# Patient Record
Sex: MALE | Race: WHITE | NOT HISPANIC OR LATINO | Employment: FULL TIME | URBAN - METROPOLITAN AREA
[De-identification: names, ages, dates, MRNs, and addresses within clinical notes are randomized per-mention and may not be internally consistent; named-entity substitution may affect disease eponyms.]

---

## 2017-11-20 ENCOUNTER — APPOINTMENT (OUTPATIENT)
Dept: CT IMAGING | Facility: CLINIC | Age: 47
End: 2017-11-20
Attending: EMERGENCY MEDICINE
Payer: COMMERCIAL

## 2017-11-20 ENCOUNTER — HOSPITAL ENCOUNTER (EMERGENCY)
Facility: CLINIC | Age: 47
Discharge: HOME | End: 2017-11-21
Attending: EMERGENCY MEDICINE | Admitting: EMERGENCY MEDICINE
Payer: COMMERCIAL

## 2017-11-20 DIAGNOSIS — R55 VASOVAGAL SYNCOPE: ICD-10-CM

## 2017-11-20 DIAGNOSIS — H53.9 VISUAL DISTURBANCE: ICD-10-CM

## 2017-11-20 LAB
ANION GAP SERPL CALCULATED.3IONS-SCNC: 5 MMOL/L (ref 3–14)
BASOPHILS # BLD AUTO: 0 10E9/L (ref 0–0.2)
BASOPHILS NFR BLD AUTO: 0.4 %
BUN SERPL-MCNC: 20 MG/DL (ref 7–30)
CALCIUM SERPL-MCNC: 9.4 MG/DL (ref 8.5–10.1)
CHLORIDE SERPL-SCNC: 109 MMOL/L (ref 94–109)
CO2 SERPL-SCNC: 30 MMOL/L (ref 20–32)
CREAT SERPL-MCNC: 1 MG/DL (ref 0.66–1.25)
DIFFERENTIAL METHOD BLD: NORMAL
EOSINOPHIL # BLD AUTO: 0.2 10E9/L (ref 0–0.7)
EOSINOPHIL NFR BLD AUTO: 3.1 %
ERYTHROCYTE [DISTWIDTH] IN BLOOD BY AUTOMATED COUNT: 13 % (ref 10–15)
GFR SERPL CREATININE-BSD FRML MDRD: 81 ML/MIN/1.7M2
GLUCOSE SERPL-MCNC: 85 MG/DL (ref 70–99)
HCT VFR BLD AUTO: 41.5 % (ref 40–53)
HGB BLD-MCNC: 14.1 G/DL (ref 13.3–17.7)
IMM GRANULOCYTES # BLD: 0 10E9/L (ref 0–0.4)
IMM GRANULOCYTES NFR BLD: 0.3 %
INTERPRETATION ECG - MUSE: NORMAL
LYMPHOCYTES # BLD AUTO: 2.2 10E9/L (ref 0.8–5.3)
LYMPHOCYTES NFR BLD AUTO: 31.3 %
MAGNESIUM SERPL-MCNC: 2.4 MG/DL (ref 1.6–2.3)
MCH RBC QN AUTO: 29.4 PG (ref 26.5–33)
MCHC RBC AUTO-ENTMCNC: 34 G/DL (ref 31.5–36.5)
MCV RBC AUTO: 87 FL (ref 78–100)
MONOCYTES # BLD AUTO: 0.6 10E9/L (ref 0–1.3)
MONOCYTES NFR BLD AUTO: 8.1 %
NEUTROPHILS # BLD AUTO: 4.1 10E9/L (ref 1.6–8.3)
NEUTROPHILS NFR BLD AUTO: 56.8 %
NRBC # BLD AUTO: 0 10*3/UL
NRBC BLD AUTO-RTO: 0 /100
PLATELET # BLD AUTO: 218 10E9/L (ref 150–450)
POTASSIUM SERPL-SCNC: 3.5 MMOL/L (ref 3.4–5.3)
RBC # BLD AUTO: 4.8 10E12/L (ref 4.4–5.9)
SODIUM SERPL-SCNC: 144 MMOL/L (ref 133–144)
WBC # BLD AUTO: 7.2 10E9/L (ref 4–11)

## 2017-11-20 PROCEDURE — 99285 EMERGENCY DEPT VISIT HI MDM: CPT | Mod: 25

## 2017-11-20 PROCEDURE — 93005 ELECTROCARDIOGRAM TRACING: CPT

## 2017-11-20 PROCEDURE — 80048 BASIC METABOLIC PNL TOTAL CA: CPT | Performed by: EMERGENCY MEDICINE

## 2017-11-20 PROCEDURE — 85025 COMPLETE CBC W/AUTO DIFF WBC: CPT | Performed by: EMERGENCY MEDICINE

## 2017-11-20 PROCEDURE — 70450 CT HEAD/BRAIN W/O DYE: CPT

## 2017-11-20 PROCEDURE — 83735 ASSAY OF MAGNESIUM: CPT | Performed by: EMERGENCY MEDICINE

## 2017-11-20 ASSESSMENT — ENCOUNTER SYMPTOMS
WEAKNESS: 0
HEADACHES: 0
SHORTNESS OF BREATH: 0
CHILLS: 0
FEVER: 0
NECK STIFFNESS: 0
DIZZINESS: 0
NAUSEA: 0
SPEECH DIFFICULTY: 0
NECK PAIN: 0
VOMITING: 0
EYE PAIN: 1

## 2017-11-20 ASSESSMENT — VISUAL ACUITY
OS: 20/15
OD: 20/15

## 2017-11-20 NOTE — ED AVS SNAPSHOT
Emergency Department    [address] [facility] [address]    Phone:  [phone]    Fax:  [phone]                                       Arpit Harper   MRN: [id number]    Department:   Emergency Department   Date of Visit:  11/20/2017           Patient Information     Date Of Birth          1970        Your diagnoses for this visit were:     Visual disturbance     Vasovagal syncope        You were seen by Yasmany Ballard MD.      Follow-up Information     Follow up with Your primary doctor In 1 week.        Follow up with  Emergency Department.    Specialty:  EMERGENCY MEDICINE    Why:  As needed    Contact information:    [address] [facility] [address] [phone]        Discharge Instructions       1. Please follow-up with your primary doctor.  2. Please keep track of your blood pressure at home.  3. Please discuss imaging such as an MRI with your primary doctor if you continue to have similar symptoms.  4. Please return to the ED as needed for new or worsening such as repeat episodes of fainting, focal weakness, further vision changes, speech changes, any other concerning symptoms.          24 Hour Appointment Hotline       To make an appointment at any [facility], call [phone]). If you don't have a family doctor or clinic, we will help you find one. [facility] are conveniently located to serve the needs of you and your family.             Review of your medicines      Notice     You have not been prescribed any medications.            Procedures and tests performed during your visit     Basic metabolic panel    CBC with platelets differential    CT Head w/o Contrast    EKG 12 lead    Magnesium      Orders Needing Specimen Collection     None      Pending Results     No orders found for last 3 day(s).            Pending Culture Results     No orders found for last 3 day(s).            Pending Results Instructions      If you had any lab results that were not finalized at the time of your Discharge, you can call the ED Lab Result RN at [phone]. You will be contacted by this team for any positive Lab results or changes in treatment. The nurses are available 7 days a week from 10A to 6:30P.  You can leave a message 24 hours per day and they will return your call.        Test Results From Your Hospital Stay        11/20/2017 11:24 PM      Component Results     Component Value Ref Range & Units Status    WBC 7.2 4.0 - 11.0 10e9/L Final    RBC Count 4.80 4.4 - 5.9 10e12/L Final    Hemoglobin 14.1 13.3 - 17.7 g/dL Final    Hematocrit 41.5 40.0 - 53.0 % Final    MCV 87 78 - 100 fl Final    MCH 29.4 26.5 - 33.0 pg Final    MCHC 34.0 31.5 - 36.5 g/dL Final    RDW 13.0 10.0 - 15.0 % Final    Platelet Count 218 150 - 450 10e9/L Final    Diff Method Automated Method  Final    % Neutrophils 56.8 % Final    % Lymphocytes 31.3 % Final    % Monocytes 8.1 % Final    % Eosinophils 3.1 % Final    % Basophils 0.4 % Final    % Immature Granulocytes 0.3 % Final    Nucleated RBCs 0 0 /100 Final    Absolute Neutrophil 4.1 1.6 - 8.3 10e9/L Final    Absolute Lymphocytes 2.2 0.8 - 5.3 10e9/L Final    Absolute Monocytes 0.6 0.0 - 1.3 10e9/L Final    Absolute Eosinophils 0.2 0.0 - 0.7 10e9/L Final    Absolute Basophils 0.0 0.0 - 0.2 10e9/L Final    Abs Immature Granulocytes 0.0 0 - 0.4 10e9/L Final    Absolute Nucleated RBC 0.0  Final         11/20/2017 11:38 PM      Component Results     Component Value Ref Range & Units Status    Sodium 144 133 - 144 mmol/L Final    Potassium 3.5 3.4 - 5.3 mmol/L Final    Chloride 109 94 - 109 mmol/L Final    Carbon Dioxide 30 20 - 32 mmol/L Final    Anion Gap 5 3 - 14 mmol/L Final    Glucose 85 70 - 99 mg/dL Final    Urea Nitrogen 20 7 - 30 mg/dL Final    Creatinine 1.00 0.66 - 1.25 mg/dL Final    GFR Estimate 81 >60 mL/min/1.7m2 Final    Non  GFR Calc    GFR Estimate If Black >90 >60 mL/min/1.7m2  Final     GFR Calc    Calcium 9.4 8.5 - 10.1 mg/dL Final         11/20/2017 11:52 PM      Narrative     CT HEAD W/O CONTRAST  11/20/2017 11:38 PM     HISTORY: Fall, head trauma, vision changes, evaluate intracranial  hemorrhage or mass.     TECHNIQUE: Axial images of the head and coronal reformations without  IV contrast material. Radiation dose for this scan was reduced using  automated exposure control, adjustment of the mA and/or kV according  to patient size, or iterative reconstruction technique.    COMPARISON: None.    FINDINGS: No intracranial hemorrhage, mass or mass effect. No acute  infarct identified. No shift of midline structures. The ventricles are  symmetric. No skull fractures.        Impression     IMPRESSION: No acute intracranial findings.    DOMINGA MANCIA MD         11/20/2017 11:38 PM      Component Results     Component Value Ref Range & Units Status    Magnesium 2.4 (H) 1.6 - 2.3 mg/dL Final                Clinical Quality Measure: Blood Pressure Screening     Your blood pressure was checked while you were in the emergency department today. The last reading we obtained was  BP: (!) 146/104 . Please read the guidelines below about what these numbers mean and what you should do about them.  If your systolic blood pressure (the top number) is less than 120 and your diastolic blood pressure (the bottom number) is less than 80, then your blood pressure is normal. There is nothing more that you need to do about it.  If your systolic blood pressure (the top number) is 120-139 or your diastolic blood pressure (the bottom number) is 80-89, your blood pressure may be higher than it should be. You should have your blood pressure rechecked within a year by a primary care provider.  If your systolic blood pressure (the top number) is 140 or greater or your diastolic blood pressure (the bottom number) is 90 or greater, you may have high blood pressure. High blood pressure is treatable,  "but if left untreated over time it can put you at risk for heart attack, stroke, or kidney failure. You should have your blood pressure rechecked by a primary care provider within the next 4 weeks.  If your provider in the emergency department today gave you specific instructions to follow-up with your doctor or provider even sooner than that, you should follow that instruction and not wait for up to 4 weeks for your follow-up visit.        Thank you for choosing [facility]       Thank you for choosing [facility] for your care. Our goal is always to provide you with excellent care. Hearing back from our patients is one way we can continue to improve our services. Please take a few minutes to complete the written survey that you may receive in the mail after you visit with us. Thank you!        [facility] Information     [facility] lets you send messages to your doctor, view your test results, renew your prescriptions, schedule appointments and more. To sign up, go to [email or web address] . Click on \"Log in\" on the left side of the screen, which will take you to the Welcome page. Then click on \"Sign up Now\" on the right side of the page.     You will be asked to enter the access code listed below, as well as some personal information. Please follow the directions to create your username and password.     Your access code is: [email or web address]  Expires: 2018 12:18 AM     Your access code will  in 90 days. If you need help or a new code, please call your [facility] clinic or [phone].        Care EveryWhere ID     This is your Care EveryWhere ID. This could be used by other organizations to access your [facility] medical records  [id number]        Equal Access to Services     [facility] : Hadkan Ramirez, catrachito neumann, yary riddle. So [facility] [phone].    ATENCIÓN: Si habla español, tiene a wilson disposición servicios gratuitos de asistencia " katerina Suttonnorma al [phone].    We comply with applicable federal civil rights laws and [place] laws. We do not discriminate on the basis of race, color, national origin, age, disability, sex, sexual orientation, or gender identity.            After Visit Summary       This is your record. Keep this with you and show to your community pharmacist(s) and doctor(s) at your next visit.

## 2017-11-20 NOTE — ED AVS SNAPSHOT
Emergency Department    [address] [place] [address]    Phone:  [phone]    Fax:  [phone]                                       Arpti Harper   MRN: [id number]    Department:   Emergency Department   Date of Visit:  11/20/2017           After Visit Summary Signature Page     I have received my discharge instructions, and my questions have been answered. I have discussed any challenges I see with this plan with the nurse or doctor.    ..........................................................................................................................................  Patient/Patient Representative Signature      ..........................................................................................................................................  Patient Representative Print Name and Relationship to Patient    ..................................................               ................................................  Date                                            Time    ..........................................................................................................................................  Reviewed by Signature/Title    ...................................................              ..............................................  Date                                                            Time

## 2017-11-21 VITALS
DIASTOLIC BLOOD PRESSURE: 104 MMHG | TEMPERATURE: 97.9 F | OXYGEN SATURATION: 95 % | RESPIRATION RATE: 16 BRPM | WEIGHT: 175 LBS | SYSTOLIC BLOOD PRESSURE: 146 MMHG | BODY MASS INDEX: 25.05 KG/M2 | HEIGHT: 70 IN

## 2017-11-21 ASSESSMENT — ENCOUNTER SYMPTOMS
LIGHT-HEADEDNESS: 1
MYALGIAS: 1

## 2017-11-21 NOTE — ED NOTES
Pt states that he has a Hx of moderate concussion early 2015. Pt c/o some mild tingling on mid to right side of head.   Denies any nausea, vomiting, headache or photophobia. Pt also concerned about his blood pressure.  Jessica Barajas RN,.......................................... 11/20/2017   10:01 PM

## 2017-11-21 NOTE — ED NOTES
MD at bedside.  Jessica Barajas RN,.......................................... 11/20/2017   10:30 PM

## 2017-11-21 NOTE — DISCHARGE INSTRUCTIONS
1. Please follow-up with your primary doctor.  2. Please keep track of your blood pressure at home.  3. Please discuss imaging such as an MRI with your primary doctor if you continue to have similar symptoms.  4. Please return to the ED as needed for new or worsening such as repeat episodes of fainting, focal weakness, further vision changes, speech changes, any other concerning symptoms.

## 2018-07-06 DIAGNOSIS — E78.5 HYPERLIPEMIA: Primary | ICD-10-CM

## 2018-07-18 ENCOUNTER — MEDICAL CORRESPONDENCE (OUTPATIENT)
Dept: HEALTH INFORMATION MANAGEMENT | Facility: CLINIC | Age: 48
End: 2018-07-18

## 2018-08-07 ENCOUNTER — HOSPITAL ENCOUNTER (OUTPATIENT)
Dept: NUCLEAR MEDICINE | Facility: CLINIC | Age: 48
Setting detail: NUCLEAR MEDICINE
Discharge: HOME | End: 2018-08-07
Attending: FAMILY MEDICINE | Admitting: FAMILY MEDICINE
Payer: COMMERCIAL

## 2018-08-07 VITALS — BODY MASS INDEX: 23.68 KG/M2 | WEIGHT: 165 LBS

## 2018-08-07 DIAGNOSIS — R10.11 RUQ ABDOMINAL PAIN: ICD-10-CM

## 2018-08-07 PROCEDURE — 25000128 H RX IP 250 OP 636: Performed by: FAMILY MEDICINE

## 2018-08-07 PROCEDURE — A9537 TC99M MEBROFENIN: HCPCS | Performed by: FAMILY MEDICINE

## 2018-08-07 PROCEDURE — 78227 HEPATOBIL SYST IMAGE W/DRUG: CPT

## 2018-08-07 PROCEDURE — 34300033 ZZH RX 343: Performed by: FAMILY MEDICINE

## 2018-08-07 RX ORDER — SINCALIDE 5 UG/5ML
0.02 INJECTION, POWDER, LYOPHILIZED, FOR SOLUTION INTRAVENOUS ONCE
Status: COMPLETED | OUTPATIENT
Start: 2018-08-07 | End: 2018-08-07

## 2018-08-07 RX ORDER — KIT FOR THE PREPARATION OF TECHNETIUM TC 99M MEBROFENIN 45 MG/10ML
5 INJECTION, POWDER, LYOPHILIZED, FOR SOLUTION INTRAVENOUS ONCE
Status: COMPLETED | OUTPATIENT
Start: 2018-08-07 | End: 2018-08-07

## 2018-08-07 RX ADMIN — MEBROFENIN 6.3 MILLICURIE: 45 INJECTION, POWDER, LYOPHILIZED, FOR SOLUTION INTRAVENOUS at 07:25

## 2018-08-07 RX ADMIN — SINCALIDE 1.5 MCG: 5 INJECTION, POWDER, LYOPHILIZED, FOR SOLUTION INTRAVENOUS at 08:45

## 2022-02-25 NOTE — ED PROVIDER NOTES
"  History     Chief Complaint:  Head Injury     HPI   Arpit Harper is a 47 year old male who presents to the emergency department today for evaluation of head injury secondary to syncopal episode and visual disturbance. The patient reports at 1130 yesterday he went to the bathroom and had a strenuous bowel movement with lightheadedness and he felt like he was going to pass out so he sat on the ground and when he went to stand up he passed out with a head injury and knee pain. The patient reports he felt knee soreness yesterday, but otherwise was fine until today when he noticed a small left-sided head pain from where he hit his head and visual disturbance around 2030, when he starting to see \"wavy\" vision, like isolated lines in one area, on the right side. He consulted with his wife, who is a nurse, who examined his eyes and body and recommended he come in to the ED after his blood pressure came back in the 160s. The patient reports visual disturbance lasted for 2 hours, only when he was sitting down, and has since resolved. The patient reports he feels \"pins and needles\" across his head presently, but very mild, with mild left eye ache that started within an hour after the visual disturbance. The patient endorses history of concussion. The patient reports history of syncope after urinating and on his way out of the restroom, which occurred 15 years ago, which he was evaluated for at [facility] ED. The patient reports he has had headaches before, but no eye pain. The patient reports history of high blood pressure managed by weight loss and diet control. The patient reports his mother has MS. The patient denies headache, current bowel movement, urinary symptoms, dizziness, speech changes, neck pain, neck stiffness, chest pain, shortness of breath, nausea, vomiting, chills, fevers, or weakness.     Allergies:  No Known Drug Allergies    Medications:    Medications reviewed. No current medications.     Past Medical " "History:    Medical history reviewed. No pertinent medical history.    Past Surgical History:    Orthopedic Surgery     Family History:    History reviewed. No pertinent family history.     Social History:  The patient was accompanied to the ED by wife.  Smoking Status: Never Smoker  Smokeless Tobacco: Never Used  Alcohol Use: Negative   Marital Status:   [2]     Review of Systems   Constitutional: Negative for chills and fever.   Eyes: Positive for pain (mild, over left eye) and visual disturbance (wavy area on right eye; no double vision).   Respiratory: Negative for shortness of breath.    Cardiovascular: Negative for chest pain.   Gastrointestinal: Negative for nausea and vomiting.   Genitourinary: Negative.    Musculoskeletal: Positive for myalgias (traumatic knee pain). Negative for neck pain and neck stiffness.   Neurological: Positive for syncope and light-headedness. Negative for dizziness, speech difficulty, weakness and headaches.   All other systems reviewed and are negative.    Physical Exam     Patient Vitals for the past 24 hrs:   BP Temp Temp src Heart Rate Resp SpO2 Height Weight   11/21/17 0000 (!) 146/104 - - - - 95 % - -   11/20/17 2352 (!) 148/98 - - - - - - -   11/20/17 2345 (!) 148/98 - - - - 93 % - -   11/20/17 2309 (!) 151/97 - - - - 95 % - -   11/20/17 2300 - - - - - 93 % - -   11/20/17 2230 (!) 169/111 - - - - 96 % - -   11/20/17 2215 (!) 154/100 - - - - 96 % - -   11/20/17 2200 (!) 152/103 - - - - 92 % - -   11/20/17 2121 (!) 160/108 97.9  F (36.6  C) Oral 79 16 94 % 1.778 m (5' 10\") 79.4 kg (175 lb)     Physical Exam  Constitutional: Well developed, nontox appearance  Head: Atraumatic.   Mouth/Throat: Oropharynx is clear and moist.   Neck:  no stridor  Eyes: no scleral icterus, PERRL, EOMI, visual fields intact bilat  Cardiovascular: RRR, 2+ bilat radial pulses  Pulmonary/Chest: nml resp effort, Clear BS bilat  Abdominal: ND, +BS, soft, NT, no rebound or guarding   : no CVA " tenderness bilat  Ext: Warm, well perfused, no edema  Neurological: A&O, CNII-XII intact, nml finger to nose, 5/5 strength throughout upper and lower ext, symmetric; sensation grossly intact, steady gait  Skin: Skin is warm and dry.   Psychiatric: Behavior is normal. Thought content normal.   Nursing note and vitals reviewed.    Emergency Department Course     ECG:  ECG taken at 2124, ECG read at 2130  Normal sinus rhythm  Normal ECG  Rate 75 bpm. ID interval 154 ms. QRS duration 86 ms. QT/QTc 370/413 ms. P-R-T axes 63 55 45.    Imaging:  Radiology findings were communicated with the patient who voiced understanding of the findings.    CT Head w/o Contrast  1. No acute intracranial findings.  DOMINGA MANCIA MD  Reading per radiology    Laboratory:  Laboratory findings were communicated with the patient who voiced understanding of the findings.    CBC: WBC 7.2, HGB 14.1,   BMP: WNL (Creatinine 1.00)  Magnesium: 2.4 (H)    Emergency Department Course:    2121 Nursing notes and vitals reviewed.    2236 I performed an exam of the patient as documented above.     2337 The patient was sent for a CT Head w/o Contrast while in the emergency department, results above.     2357 I discussed the treatment plan with the patient. They expressed understanding of this plan and consented to discharge. They will be discharged home with instructions for care and follow up. In addition, the patient will return to the emergency department if their symptoms persist, worsen, if new symptoms arise or if there is any concern.  All questions were answered.    2357 I personally reviewed the ECG, imaging, and laboratory results with the patient and answered all related questions prior to discharge.    Impression & Plan      Medical Decision Making:  Arpit Harper is a 47 year old male who presents to the emergency department today for evaluation of transient visual disturbance, left frontal headache, recent syncopal  episode.    Differential diagnosis includes vasovagal syncope, concussion, electrolyte abnormality, intracranial abnormality such as bleed or mass, migraine or migraine variant w/ aura/scintillating scotoma. The patient has a normal neurologic exam and intact visual fields. His ECG is noted as above with no signs of arrhythmia. The patient likely had a vasovagal syncopal episode after his bowel movement and he reports having vasovagal syncopal episodes with previous episodes of urination. Labs were ordered as noted and unremarkable other than minimally elevated magnesium. At this point, I feel the patient is safe for discharge. He is hypertensive in the emergency department which improved without intervention. Recommendations given regarding keeping track of his blood pressures at home and follow up with primary care doctor for recheck. CT returned normal and was ordered given that he has not had these symptoms prior. I recommended the patient follow up with his primary care physician and discussed further imaging modalities such as MRI if he continues to have similar visual disturbances. It seems more likely this is migraine, but given this is a new type of headache, onset at the patient's age, it would not be unreasonable to acquire further imaging should symptoms continue. He is counseled on results, diagnosis, and disposition. He is understanding and agreeable to plan. The patient is stable condition.     Diagnosis:    ICD-10-CM    1. Visual disturbance H53.9    2. Vasovagal syncope R55      Disposition:   The patient is discharged to home.    Scribe Disclosure:  Pamela ALONSO, am serving as a scribe at 10:29 PM on 11/20/2017 to document services personally performed by Yasmany aBllard MD based on my observations and the provider's statements to me.     EMERGENCY DEPARTMENT       Yasmany Ballard MD  11/21/17 0890     25-Feb-2022 11:50

## 2024-08-25 ENCOUNTER — HOSPITAL ENCOUNTER (EMERGENCY)
Facility: CLINIC | Age: 54
Discharge: HOME | End: 2024-08-25
Attending: EMERGENCY MEDICINE | Admitting: EMERGENCY MEDICINE
Payer: COMMERCIAL

## 2024-08-25 ENCOUNTER — APPOINTMENT (OUTPATIENT)
Dept: CT IMAGING | Facility: CLINIC | Age: 54
End: 2024-08-25
Attending: EMERGENCY MEDICINE
Payer: COMMERCIAL

## 2024-08-25 VITALS
WEIGHT: 195 LBS | RESPIRATION RATE: 18 BRPM | TEMPERATURE: 97.9 F | HEART RATE: 78 BPM | DIASTOLIC BLOOD PRESSURE: 90 MMHG | BODY MASS INDEX: 27.92 KG/M2 | OXYGEN SATURATION: 94 % | HEIGHT: 70 IN | SYSTOLIC BLOOD PRESSURE: 147 MMHG

## 2024-08-25 DIAGNOSIS — R91.1 PULMONARY NODULE: ICD-10-CM

## 2024-08-25 DIAGNOSIS — R07.9 CHEST PAIN, UNSPECIFIED TYPE: ICD-10-CM

## 2024-08-25 LAB
ANION GAP SERPL CALCULATED.3IONS-SCNC: 11 MMOL/L (ref 7–15)
BASOPHILS # BLD AUTO: 0.1 10E3/UL (ref 0–0.2)
BASOPHILS NFR BLD AUTO: 1 %
BUN SERPL-MCNC: 17.7 MG/DL (ref 6–20)
CALCIUM SERPL-MCNC: 10 MG/DL (ref 8.8–10.4)
CHLORIDE SERPL-SCNC: 99 MMOL/L (ref 98–107)
CREAT SERPL-MCNC: 1.22 MG/DL (ref 0.67–1.17)
EGFRCR SERPLBLD CKD-EPI 2021: 71 ML/MIN/1.73M2
EOSINOPHIL # BLD AUTO: 0.1 10E3/UL (ref 0–0.7)
EOSINOPHIL NFR BLD AUTO: 2 %
ERYTHROCYTE [DISTWIDTH] IN BLOOD BY AUTOMATED COUNT: 12.3 % (ref 10–15)
GLUCOSE SERPL-MCNC: 100 MG/DL (ref 70–99)
HCO3 SERPL-SCNC: 30 MMOL/L (ref 22–29)
HCT VFR BLD AUTO: 45.1 % (ref 40–53)
HGB BLD-MCNC: 15.4 G/DL (ref 13.3–17.7)
HOLD SPECIMEN: NORMAL
IMM GRANULOCYTES # BLD: 0 10E3/UL
IMM GRANULOCYTES NFR BLD: 1 %
LYMPHOCYTES # BLD AUTO: 2.4 10E3/UL (ref 0.8–5.3)
LYMPHOCYTES NFR BLD AUTO: 32 %
MCH RBC QN AUTO: 29.8 PG (ref 26.5–33)
MCHC RBC AUTO-ENTMCNC: 34.1 G/DL (ref 31.5–36.5)
MCV RBC AUTO: 87 FL (ref 78–100)
MONOCYTES # BLD AUTO: 0.5 10E3/UL (ref 0–1.3)
MONOCYTES NFR BLD AUTO: 7 %
NEUTROPHILS # BLD AUTO: 4.5 10E3/UL (ref 1.6–8.3)
NEUTROPHILS NFR BLD AUTO: 58 %
NRBC # BLD AUTO: 0 10E3/UL
NRBC BLD AUTO-RTO: 0 /100
PLATELET # BLD AUTO: 284 10E3/UL (ref 150–450)
POTASSIUM SERPL-SCNC: 3.6 MMOL/L (ref 3.4–5.3)
RBC # BLD AUTO: 5.17 10E6/UL (ref 4.4–5.9)
SODIUM SERPL-SCNC: 140 MMOL/L (ref 135–145)
TROPONIN T SERPL HS-MCNC: <6 NG/L
WBC # BLD AUTO: 7.7 10E3/UL (ref 4–11)

## 2024-08-25 PROCEDURE — 250N000009 HC RX 250: Performed by: EMERGENCY MEDICINE

## 2024-08-25 PROCEDURE — 70450 CT HEAD/BRAIN W/O DYE: CPT

## 2024-08-25 PROCEDURE — 85025 COMPLETE CBC W/AUTO DIFF WBC: CPT | Performed by: EMERGENCY MEDICINE

## 2024-08-25 PROCEDURE — 84484 ASSAY OF TROPONIN QUANT: CPT | Performed by: EMERGENCY MEDICINE

## 2024-08-25 PROCEDURE — 71260 CT THORAX DX C+: CPT

## 2024-08-25 PROCEDURE — 70496 CT ANGIOGRAPHY HEAD: CPT

## 2024-08-25 PROCEDURE — 93005 ELECTROCARDIOGRAM TRACING: CPT

## 2024-08-25 PROCEDURE — 36415 COLL VENOUS BLD VENIPUNCTURE: CPT | Performed by: EMERGENCY MEDICINE

## 2024-08-25 PROCEDURE — 99285 EMERGENCY DEPT VISIT HI MDM: CPT | Mod: 25

## 2024-08-25 PROCEDURE — 80048 BASIC METABOLIC PNL TOTAL CA: CPT | Performed by: EMERGENCY MEDICINE

## 2024-08-25 PROCEDURE — 250N000011 HC RX IP 250 OP 636: Performed by: EMERGENCY MEDICINE

## 2024-08-25 RX ORDER — IOPAMIDOL 755 MG/ML
90 INJECTION, SOLUTION INTRAVASCULAR ONCE
Status: COMPLETED | OUTPATIENT
Start: 2024-08-25 | End: 2024-08-25

## 2024-08-25 RX ADMIN — SODIUM CHLORIDE 100 ML: 9 INJECTION, SOLUTION INTRAVENOUS at 20:36

## 2024-08-25 RX ADMIN — IOPAMIDOL 90 ML: 755 INJECTION, SOLUTION INTRAVENOUS at 20:35

## 2024-08-25 ASSESSMENT — ACTIVITIES OF DAILY LIVING (ADL)
ADLS_ACUITY_SCORE: 35

## 2024-08-25 ASSESSMENT — COLUMBIA-SUICIDE SEVERITY RATING SCALE - C-SSRS
1. IN THE PAST MONTH, HAVE YOU WISHED YOU WERE DEAD OR WISHED YOU COULD GO TO SLEEP AND NOT WAKE UP?: NO
2. HAVE YOU ACTUALLY HAD ANY THOUGHTS OF KILLING YOURSELF IN THE PAST MONTH?: NO
6. HAVE YOU EVER DONE ANYTHING, STARTED TO DO ANYTHING, OR PREPARED TO DO ANYTHING TO END YOUR LIFE?: NO

## 2024-08-25 NOTE — ED TRIAGE NOTES
Pt has chest pain that started this morning. Some improvement with pepcid. Pt prompted to come to ER after feeling more short of breath with activity and having some pain in his neck. Pt reports intermittent tingling in the entire body today. Hx of HTN     Triage Assessment (Adult)       Row Name 08/25/24 5472          Triage Assessment    Airway WDL WDL        Respiratory WDL    Respiratory WDL X;all     Rhythm/Pattern, Respiratory depth regular;pattern regular;unlabored;dyspnea upon exertion        Skin Circulation/Temperature WDL    Skin Circulation/Temperature WDL WDL        Cardiac WDL    Cardiac WDL X;chest pain  midsternal chest pain, muscle pain in the neck        Peripheral/Neurovascular WDL    Peripheral Neurovascular WDL WDL        Cognitive/Neuro/Behavioral WDL    Cognitive/Neuro/Behavioral WDL WDL

## 2024-08-26 LAB
ATRIAL RATE - MUSE: 85 BPM
DIASTOLIC BLOOD PRESSURE - MUSE: NORMAL MMHG
INTERPRETATION ECG - MUSE: NORMAL
P AXIS - MUSE: 66 DEGREES
PR INTERVAL - MUSE: 160 MS
QRS DURATION - MUSE: 80 MS
QT - MUSE: 368 MS
QTC - MUSE: 437 MS
R AXIS - MUSE: 47 DEGREES
SYSTOLIC BLOOD PRESSURE - MUSE: NORMAL MMHG
T AXIS - MUSE: 47 DEGREES
VENTRICULAR RATE- MUSE: 85 BPM

## 2024-08-26 NOTE — ED PROVIDER NOTES
"  Emergency Department Note      History of Present Illness     Chief Complaint   Chest Pain      HPI   Arpit Harper is a 53 year old male with history of hypertension, hyperlipidemia, and prediabetes who presents to the ED with his wife for evaluation of chest pain. The patient reports onset of chest pain this morning that he initially attributed to heartburn. He took Pepcid with initial relief and took it easy the rest of the day. Arpit states he had an episode of \"tingling\" in his hands and face. He felt chest \"pressure\" on his way home and developed a headache which has since resolved. He ate dinner and took another Pepcid, but the chest pain reoccurred after putting groceries away. Arpit also notes bilateral neck pain, shortness of breath, and tinnitus which is not new but he also has not experienced this in a long time, per wife. Symptoms are worse with exertion. Patient endorses some neck pain and lightheadedness now in the ED. Wife reports notable history of hypertension, chest pain, and high calcium scoring. No abdominal pain, back pain, immediate family history of early heart disease, or heavy alcohol, street drug, or tobacco use. He denies unilateral weakness, slurred speech, or diplopia. Patient reports he exercises regularly and is eating a healthier diet.     Independent Historian   Wife as detailed above.    Review of External Notes   I reviewed Dr. Caba's 7/15/24 Office Visit regarding well adult exam. Patient has a history of hypertension and hyperlipidemia.    Past Medical History     Medical History and Problem List   Adjustment disorder  Anxiety  Elevated coronary artery calcium score  Erectile dysfunction  Hypertension  Hyperlipidemia  Obstructive sleep apnea   Prediabetes  Sensorineural hearing loss, bilateral  Testicular hypofunction   Allergic rhinitis    Medications   Amlodipine  Aspirin 81 mg   Metoprolol ER  Rosuvastatin   Tadalafil   Testosterone cypionate     Surgical History " "  Orthopedic surgery    Physical Exam     Patient Vitals for the past 24 hrs:   BP Temp Temp src Pulse Resp SpO2 Height Weight   08/25/24 2115 -- -- -- 81 15 94 % -- --   08/25/24 2100 -- -- -- 80 16 97 % -- --   08/25/24 2051 -- -- -- 79 10 94 % -- --   08/25/24 2000 136/89 -- -- 85 13 94 % -- --   08/25/24 1930 (!) 147/92 -- -- 78 13 94 % -- --   08/25/24 1856 (!) 176/97 97.9  F (36.6  C) Oral 83 18 95 % 1.778 m (5' 10\") 88.5 kg (195 lb)     Physical Exam    General: Resting on the bed.  Head: No obvious trauma to head.  Ears, Nose, Throat:  External ears normal.  Nose normal.   Eyes:  Conjunctivae clear.  Pupils are equal, round, and reactive.   Neck: Normal range of motion.  Neck supple.   CV: Regular rate and rhythm.  No murmurs.   2+ radial pulses   Respiratory: Effort normal and breath sounds normal.  No wheezing or crackles.   Gastrointestinal: Soft.  No distension. There is no tenderness.  There is no rigidity, no rebound and no guarding.   Musculoskeletal: Non tender non edematous calves  Neuro: Alert. Moving all extremities appropriately.  Normal speech.  CN II-XII grossly intact, no pronator drift, normal finger-nose-finger, visual fields intact.  Gross muscle strength intact of the proximal and distal bilateral upper and lower extremities.  Sensation intact to light touch in all 4 extremities.    Skin: Skin is warm and dry.  No rash noted.     Diagnostics     Lab Results   Labs Ordered and Resulted from Time of ED Arrival to Time of ED Departure   BASIC METABOLIC PANEL - Abnormal       Result Value    Sodium 140      Potassium 3.6      Chloride 99      Carbon Dioxide (CO2) 30 (*)     Anion Gap 11      Urea Nitrogen 17.7      Creatinine 1.22 (*)     GFR Estimate 71      Calcium 10.0      Glucose 100 (*)    TROPONIN T, HIGH SENSITIVITY - Normal    Troponin T, High Sensitivity <6     CBC WITH PLATELETS AND DIFFERENTIAL    WBC Count 7.7      RBC Count 5.17      Hemoglobin 15.4      Hematocrit 45.1      MCV " 87      MCH 29.8      MCHC 34.1      RDW 12.3      Platelet Count 284      % Neutrophils 58      % Lymphocytes 32      % Monocytes 7      % Eosinophils 2      % Basophils 1      % Immature Granulocytes 1      NRBCs per 100 WBC 0      Absolute Neutrophils 4.5      Absolute Lymphocytes 2.4      Absolute Monocytes 0.5      Absolute Eosinophils 0.1      Absolute Basophils 0.1      Absolute Immature Granulocytes 0.0      Absolute NRBCs 0.0         Imaging   CTA Head Neck with Contrast   Final Result   IMPRESSION:    HEAD CT:   1.  Normal head CT.      HEAD CTA:    1.  Normal CTA [place].      NECK CTA:   1.  Normal neck CTA.      CT Aortic Survey w Contrast   Final Result   IMPRESSION:   1.  No acute aortic abnormality.   2.  No additional visualized explanation for patient's symptoms.   3.  Incidental 4 mm right lower lobe pulmonary nodule, consider follow-up below.      REFERENCE:   Guidelines for Management of Incidental Pulmonary Nodules Detected on CT Images: From the Fleischner Society 2017.    Guidelines apply to incidental nodules in patients who are 35 years or older.   Guidelines do not apply to lung cancer screening, patients with immunosuppression, or patients with known primary cancer.      SINGLE NODULE   Nodule size <6 mm   Low-risk patients: No follow-up needed.   High-risk patients: Optional follow-up at 12 months.               CT Head w/o Contrast   Final Result   IMPRESSION:    HEAD CT:   1.  Normal head CT.      HEAD CTA:    1.  Normal CTA [place].      NECK CTA:   1.  Normal neck CTA.          EKG   ECG taken at 1855, ECG read at 1856  Normal sinus rhythm  Normal ECG   No significant changes as compared to prior, dated 11/20/17.  Rate 85 bpm. MA interval 160 ms. QRS duration 80 ms. QT/QTc 368/437 ms. P-R-T axes 66 47 47.    Independent Interpretation   CT Head: No intracranial hemorrhage.    ED Course      Medications Administered   Medications   iopamidol (ISOVUE-370) solution 90  mL (90 mLs Intravenous $Given 8/25/24 2035)   sodium chloride 0.9 % CT scan flush use (100 mLs Intravenous $Given 8/25/24 2036)       Procedures   Procedures     Discussion of Management   None    ED Course   ED Course as of 08/25/24 2135   Sun Aug 25, 2024   1908 I obtained history and examined the patient as noted above.    2135 I rechecked the patient and explained findings.        Additional Documentation  None    Medical Decision Making / Diagnosis     CMS Diagnoses: None    MIPS       None    MDM   Arpit Harper is a 53 year old male who presents with Emergency Department with chest discomfort.  Vital signs are reassuring.  Broad differentials considered include not limited to ACS, arrhythmia, PE, dissection, pneumonia, pneumothorax, effusion, etc.  CBC shows no leukocytosis or anemia.  BMP without acute electrolyte, metabolic or renal dysfunction.  EKG shows sinus rhythm.  No acute ischemic change.  Troponin is undetectable.  Heart score 3.  Overall low suspicion for ACS or arrhythmia.  With negative troponin no further cardiac workup is indicated at this time per our high-sensitivity troponin pathway.  Concern with patient's neck discomfort and ringing in the ear for possible dissection therefore a CTA head and neck as well as CT aorta was obtained.  Fortunately this was negative for any acute aortic abnormality.  No other visualized abnormalities.  No stenosis, dissection, aneurysm.  No intracranial hemorrhage.  Nonfocal neurologic exam I do not see any evidence of stroke.  Patient made aware of pulmonary nodule.  Unclear etiology of patient's symptoms advise close monitoring with outpatient provider.  Return precautions were discussed including worsening chest pain, shortness of breath, etc.  Patient was discharged.    Disposition   The patient was discharged.     Diagnosis     ICD-10-CM    1. Chest pain, unspecified type  R07.9       2. Pulmonary nodule  R91.1            Discharge Medications   New  Prescriptions    No medications on file         Scribe Disclosure:  I, Radha Gallo, am serving as a scribe at 7:28 PM on 8/25/2024 to document services personally performed by Evelyn Whitehead MD based on my observations and the provider's statements to me.        Evelyn Whitehead MD  08/25/24 [id number]